# Patient Record
Sex: MALE | Race: BLACK OR AFRICAN AMERICAN | NOT HISPANIC OR LATINO
[De-identification: names, ages, dates, MRNs, and addresses within clinical notes are randomized per-mention and may not be internally consistent; named-entity substitution may affect disease eponyms.]

---

## 2020-04-26 ENCOUNTER — MESSAGE (OUTPATIENT)
Age: 25
End: 2020-04-26

## 2022-02-04 ENCOUNTER — EMERGENCY (EMERGENCY)
Facility: HOSPITAL | Age: 27
LOS: 1 days | Discharge: HOME | End: 2022-02-04
Attending: EMERGENCY MEDICINE
Payer: COMMERCIAL

## 2022-02-04 VITALS
SYSTOLIC BLOOD PRESSURE: 128 MMHG | HEART RATE: 71 BPM | WEIGHT: 175.05 LBS | RESPIRATION RATE: 20 BRPM | DIASTOLIC BLOOD PRESSURE: 85 MMHG | TEMPERATURE: 98 F | OXYGEN SATURATION: 99 % | HEIGHT: 72 IN

## 2022-02-04 VITALS
DIASTOLIC BLOOD PRESSURE: 80 MMHG | HEART RATE: 80 BPM | TEMPERATURE: 98 F | SYSTOLIC BLOOD PRESSURE: 122 MMHG | OXYGEN SATURATION: 98 % | RESPIRATION RATE: 18 BRPM

## 2022-02-04 PROCEDURE — 99283 EMERGENCY DEPT VISIT LOW MDM: CPT

## 2022-02-04 PROCEDURE — 90471 IMMUNIZATION ADMIN: CPT

## 2022-02-04 PROCEDURE — 99283 EMERGENCY DEPT VISIT LOW MDM: CPT | Mod: 25

## 2022-02-04 PROCEDURE — 90715 TDAP VACCINE 7 YRS/> IM: CPT

## 2022-02-04 RX ORDER — OFLOXACIN 0.3 %
1 DROPS OPHTHALMIC (EYE) ONCE
Refills: 0 | Status: COMPLETED | OUTPATIENT
Start: 2022-02-04 | End: 2022-02-04

## 2022-02-04 RX ORDER — IBUPROFEN 200 MG
600 TABLET ORAL ONCE
Refills: 0 | Status: COMPLETED | OUTPATIENT
Start: 2022-02-04 | End: 2022-02-04

## 2022-02-04 RX ORDER — ERYTHROMYCIN BASE 5 MG/GRAM
1 OINTMENT (GRAM) OPHTHALMIC (EYE) ONCE
Refills: 0 | Status: COMPLETED | OUTPATIENT
Start: 2022-02-04 | End: 2022-02-04

## 2022-02-04 RX ORDER — TETANUS TOXOID, REDUCED DIPHTHERIA TOXOID AND ACELLULAR PERTUSSIS VACCINE, ADSORBED 5; 2.5; 8; 8; 2.5 [IU]/.5ML; [IU]/.5ML; UG/.5ML; UG/.5ML; UG/.5ML
0.5 SUSPENSION INTRAMUSCULAR ONCE
Refills: 0 | Status: COMPLETED | OUTPATIENT
Start: 2022-02-04 | End: 2022-02-04

## 2022-02-04 RX ADMIN — Medication 1 APPLICATION(S): at 19:54

## 2022-02-04 RX ADMIN — Medication 600 MILLIGRAM(S): at 19:11

## 2022-02-04 RX ADMIN — Medication 1 DROP(S): at 19:54

## 2022-02-04 RX ADMIN — TETANUS TOXOID, REDUCED DIPHTHERIA TOXOID AND ACELLULAR PERTUSSIS VACCINE, ADSORBED 0.5 MILLILITER(S): 5; 2.5; 8; 8; 2.5 SUSPENSION INTRAMUSCULAR at 19:53

## 2022-02-04 NOTE — ED PROVIDER NOTE - PATIENT PORTAL LINK FT
You can access the FollowMyHealth Patient Portal offered by [facility] by registering at the following website: [email or web address]. By joining [employer]’s FollowMyHealth portal, you will also be able to view your health information using other applications (apps) compatible with our system.

## 2022-02-04 NOTE — ED ADULT NURSE NOTE - OBJECTIVE STATEMENT
26 male with left eye injury that occurred yesterday, worsening pain today. Examined by NP, medicated for pain. Well appearing.

## 2022-02-04 NOTE — ED PROVIDER NOTE - NSFOLLOWUPCLINICS_GEN_ALL_ED_FT
[facility]  Ophthalmology  [address] [place] [address], [facility] [address]  [place] [address]  Phone: [phone]  Fax:

## 2022-02-04 NOTE — ED PROVIDER NOTE - OBJECTIVE STATEMENT
26y M that is a [employer] w/ no pertinent PMHx presents to the ED c/o L eye pain, redness and swelling. Pt reports that he was adjusting N95 mask when the elastic mask strap hit L eye while at work yesterday. Pt came to the ED today due to worsening symptoms. Denies wearing contact lenses. Denies other medical complaints. Denies smoking, ETOH use. Denies blurred vision or double vision.. Denies taking pain medication today. NKDA.

## 2022-02-04 NOTE — ED PROVIDER NOTE - ATTENDING CONTRIBUTION TO CARE
Attending MD Villela:   I personally have seen and examined this patient.  Physician assistant note reviewed and agree on plan of care and except where noted.  See below for details.     Seen in [place], [employer] Employee    26M with no reported PMH/PSH/Meds, no known drug allergies, unknown last tetanus presents to the ED with L eye pain.  Reports that yesterday was putting on N95 when it moved and a part of it hit his left eye.  Reports since then has been having gradually worsening L eye pain.  Denies contact lens use ever or glasses ever. Denies change in vision, double vision, sudden loss of vision. Denies fevers, chills. Denies pain with EOM.  Denies foreign body sensation.  Reports increased tearing and eye ache.      Exam:   General: NAD  HENT: head NCAT, airway patent   Eyes: PERRL, EOMI, confrontational VF full, Va sc OD 20/20, OS 20/25, no periorbital ecchymosis, no tenderness to palpation of orbital rim, lid margins clear, no retained foreign body on lid eversion, no conjunctival injection OD, +1 conjunctival injection OS, no corneal defect OD, +corneal defect OS at 6 oclock almost centrally just inferior to the pupil during examination with slit lamp, SPK OS inferiorly, AC no cells or flare, fundus exam limited, neg Elie's  Chest: symmetric chest rise, no increased work of breathing  MSK: ranging neck freely  Neuro: moving all extremities spontaneously, sensory grossly intact, no gross neuro deficits  Psych: normal mood and affect     A/P: 26M with corneal abrasion OS, will give Ocuflox gtts QID and Erythromycin bernabe qAM and qHS, will update tetanus, will follow up with ophtho on Monday.  Stable for discharge after meds. Follow up instructions given, importance of follow up emphasized, return to ED parameters reviewed and patient verbalized understanding.  All questions answered, all concerns addressed.

## 2022-02-04 NOTE — ED PROVIDER NOTE - NSFOLLOWUPINSTRUCTIONS_ED_ALL_ED_FT
Ofloxacin eye drop,   Corneal Abrasion     WHAT YOU NEED TO KNOW:    What is a corneal abrasion? A corneal abrasion is a scratch on the cornea of your eye. The cornea is the clear layer that covers the front of your eye.    Eye Anatomy         What causes a corneal abrasion?   •Contact lenses that do not fit well, are damaged, or are worn too long      •A scratch or poke from a fingernail or objects such as a pencil or tree branch      •Objects such as dirt, sand, or metal shavings that get into your eye      •Rubbing your eyes too hard      •Chemical or flash burns caused by extreme heat      What are the signs and symptoms of a corneal abrasion?   •Pain, redness, or swelling of the eye      •Difficulty opening the eye      •More tears than usual      •A feeling that you have something in your eye      •Blurred vision      •Sensitivity to bright light      •Headache      How is a corneal abrasion diagnosed? Your healthcare provider will examine your eye. If you have something in your eye that is scratching your cornea, your healthcare provider will remove it. He or she may put dye in your eye and look at it with a light. The light and dye can help your provider see if your cornea has been scratched.             How is a corneal abrasion treated? You may be given antibiotic eyedrops or ointment to help prevent an eye infection. You may also be given eyedrops to decrease pain. A small scratch may heal in 1 to 2 days. Deeper or larger scratches may take longer to heal.    What can I do to care for my eyes?   •Get regular eye exams. Get your eyes checked at least every year.      •Eat healthy foods. Fresh fruits and vegetables that are rich in vitamins A and C may help with your vision. Foods such as sweet potatoes, apricots, and carrots are rich in nutrients for the eyes.  Sources of Vitamin A       Sources of Vitamin C           •Take care of your contacts or glasses. Store, clean, and use your contacts or glasses as directed. Replace your glasses or contact lenses as often as your healthcare provider suggests.      •Decrease eye strain. Rest your eyes, especially after you read or use a computer for long periods of time. Get plenty of sleep at night. Use lights that reduce glare in your home, school, or workplace.      •Wear dark sunglasses. This will help prevent pain and light sensitivity. Make sure the sunglasses have UVA and UVB protection. This will protect your eyes when you go outside.      •Use eyedrops safely. If your treatment plan includes eyedrops, it is important to use them as directed. Your provider may give you detailed instructions to follow. The eyedrops may also come with safety instructions. Follow all instructions to help prevent an infection. Do not touch the tip of the bottle to your eye. Germs from your eye can spread to the medicine bottle.  Steps 1 2 3 4           How can I help prevent corneal abrasions?   •Remove your contact lenses if your eyes feel dry or irritated.      •Wash your hands if you need to touch your eyes or your face.  Handwashing           •Trim your fingernails so you cannot scratch your eye.      •Wear protective eyewear when you work with chemicals, wood, dust, or metal.      •Wear protective eyewear when you play sports.      •Do not wear your contacts for longer than you should.      •Do not sleep with your contacts in.      •Do not wear glitter makeup. Glitter can easily get into your eyes and under contact lenses.      When should I call my doctor?   •Your eye pain or vision gets worse.      •You have yellow or green drainage from your eye.      •You have questions or concerns about your condition or care.      CARE AGREEMENT:    You have the right to help plan your care. Learn about your health condition and how it may be treated. Discuss treatment options with your healthcare providers to decide what care you want to receive. You always have the right to refuse treatment. YOU WERE SEEN IN THE ED FOR: corneal abrasion of the left eye    WHILE YOU WERE HERE, YOU HAD: an eye exam and had your TDAP updated.  YOU WERE PRESCRIBED: Ocuflox drops to be used FOUR TIMES A DAY and Erythromycin ointment to be used once in the morning and once at bedtime.  Do not use the drops at the same time as the ointment.    FOLLOW THE INSTRUCTIONS ON THE LABEL/CONTAINER    FOR PAIN, YOU MAY TAKE TYLENOL (Acetaminophen) AND/OR IBUPROFEN (Advil or Motrin). FOLLOW THE INSTRUCTIONS ON THE LABEL/CONTAINER.    PLEASE FOLLOW UP WITH THE OPHTHALMOLOGY CLINIC ON MONDAY 2/7/22.  BRING COPIES OF YOUR RESULTS.    RETURN TO THE EMERGENCY DEPARTMENT IF YOU EXPERIENCE ANY NEW/CONCERNING/WORSENING SYMPTOMS SUCH AS BUT NOT LIMITED TO:   change in vision, double vision, sudden loss of vision, purulence or blood from eye, fevers, chills, pain with moving eye, any other concerns.      WHAT YOU NEED TO KNOW:    What is a corneal abrasion? A corneal abrasion is a scratch on the cornea of your eye. The cornea is the clear layer that covers the front of your eye.    Eye Anatomy     What causes a corneal abrasion?   •Contact lenses that do not fit well, are damaged, or are worn too long  •A scratch or poke from a fingernail or objects such as a pencil or tree branch  •Objects such as dirt, sand, or metal shavings that get into your eye  •Rubbing your eyes too hard  •Chemical or flash burns caused by extreme heat  What are the signs and symptoms of a corneal abrasion?   •Pain, redness, or swelling of the eye  •Difficulty opening the eye  •More tears than usual  •A feeling that you have something in your eye  •Blurred vision  •Sensitivity to bright light  •Headache  How is a corneal abrasion diagnosed? Your healthcare provider will examine your eye. If you have something in your eye that is scratching your cornea, your healthcare provider will remove it. He or she may put dye in your eye and look at it with a light. The light and dye can help your provider see if your cornea has been scratched.  How is a corneal abrasion treated? You may be given antibiotic eyedrops or ointment to help prevent an eye infection. You may also be given eyedrops to decrease pain. A small scratch may heal in 1 to 2 days. Deeper or larger scratches may take longer to heal.  What can I do to care for my eyes?   •Get regular eye exams. Get your eyes checked at least every year.  •Eat healthy foods. Fresh fruits and vegetables that are rich in vitamins A and C may help with your vision. Foods such as sweet potatoes, apricots, and carrots are rich in nutrients for the eyes.  Sources of Vitamin A  Sources of Vitamin C  •Take care of your contacts or glasses. Store, clean, and use your contacts or glasses as directed. Replace your glasses or contact lenses as often as your healthcare provider suggests.  •Decrease eye strain. Rest your eyes, especially after you read or use a computer for long periods of time. Get plenty of sleep at night. Use lights that reduce glare in your home, school, or workplace.  •Wear dark sunglasses. This will help prevent pain and light sensitivity. Make sure the sunglasses have UVA and UVB protection. This will protect your eyes when you go outside.  •Use eyedrops safely. If your treatment plan includes eyedrops, it is important to use them as directed. Your provider may give you detailed instructions to follow. The eyedrops may also come with safety instructions. Follow all instructions to help prevent an infection. Do not touch the tip of the bottle to your eye. Germs from your eye can spread to the medicine bottle.  How can I help prevent corneal abrasions?   •Remove your contact lenses if your eyes feel dry or irritated.  •Wash your hands if you need to touch your eyes or your face.  Handwashing   •Trim your fingernails so you cannot scratch your eye.  •Wear protective eyewear when you work with chemicals, wood, dust, or metal.  •Wear protective eyewear when you play sports.  •Do not wear your contacts for longer than you should.  •Do not sleep with your contacts in.  •Do not wear glitter makeup. Glitter can easily get into your eyes and under contact lenses.  When should I call my doctor?   •Your eye pain or vision gets worse.  •You have yellow or green drainage from your eye.  •You have questions or concerns about your condition or care.  CARE AGREEMENT:  You have the right to help plan your care. Learn about your health condition and how it may be treated. Discuss treatment options with your healthcare providers to decide what care you want to receive. You always have the right to refuse treatment.

## 2022-02-04 NOTE — ED ADULT NURSE NOTE - NSIMPLEMENTINTERV_GEN_ALL_ED
Implemented All Universal Safety Interventions:  [place] to call system. Call bell, personal items and telephone within reach. Instruct patient to call for assistance. Room bathroom lighting operational. Non-slip footwear when patient is off stretcher. Physically safe environment: no spills, clutter or unnecessary equipment. Stretcher in lowest position, wheels locked, appropriate side rails in place.

## 2022-02-07 PROBLEM — Z78.9 OTHER SPECIFIED HEALTH STATUS: Chronic | Status: ACTIVE | Noted: 2022-02-04

## 2022-02-08 ENCOUNTER — NON-APPOINTMENT (OUTPATIENT)
Age: 27
End: 2022-02-08

## 2022-02-08 ENCOUNTER — APPOINTMENT (OUTPATIENT)
Dept: OPHTHALMOLOGY | Facility: CLINIC | Age: 27
End: 2022-02-08
Payer: COMMERCIAL

## 2022-02-08 PROCEDURE — 92002 INTRM OPH EXAM NEW PATIENT: CPT

## 2022-11-02 NOTE — ED PROVIDER NOTE - EYES
Chronic stable satisfactory control, continue current medications along with healthy lifestyle   Left Abnormal/Right Normal